# Patient Record
Sex: FEMALE | Race: WHITE
[De-identification: names, ages, dates, MRNs, and addresses within clinical notes are randomized per-mention and may not be internally consistent; named-entity substitution may affect disease eponyms.]

---

## 2020-12-30 ENCOUNTER — HOSPITAL ENCOUNTER (OUTPATIENT)
Dept: HOSPITAL 33 - SDC | Age: 60
Discharge: HOME | End: 2020-12-30
Attending: FAMILY MEDICINE
Payer: COMMERCIAL

## 2020-12-30 VITALS — HEART RATE: 85 BPM | SYSTOLIC BLOOD PRESSURE: 148 MMHG | DIASTOLIC BLOOD PRESSURE: 96 MMHG

## 2020-12-30 VITALS — OXYGEN SATURATION: 96 %

## 2020-12-30 DIAGNOSIS — D12.8: ICD-10-CM

## 2020-12-30 DIAGNOSIS — D12.3: ICD-10-CM

## 2020-12-30 DIAGNOSIS — K57.30: ICD-10-CM

## 2020-12-30 DIAGNOSIS — Z12.11: Primary | ICD-10-CM

## 2020-12-30 NOTE — OP
SURGERY DATE/TIME:   12/30/2020  0800    



PREOPERATIVE DIAGNOSES:      

1)  History of diverticulitis. 

2)  Need for screening colonoscopy.



POSTOPERATIVE DIAGNOSES:    

1)  Colon polyps x2. 

2)  Sigmoid diverticulosis.



PROCEDURE:    Colonoscopy. 



SURGEON: Rayshawn Mckeon M.D.



ANESTHESIA:  MAC by Chente Jacobs CRNA. 



ESTIMATED BLOOD LOSS:  Minimal. 



SPECIMENS:  Two hot forceps polypectomies one from the transverse colon and one from the 
rectum.



DESCRIPTION OF PROCEDURE:  After informed written consent was obtained, the patient was 
taken to the endoscopy suite. She was placed in left lateral decubitus position and 
anesthesia was titrated to desired level of consciousness. A digital rectal exam showed 
normal sphincter tone and no internal lesions. The scope was inserted into the rectum and 
sequentially the entire colonic mucosa was traversed. The level of cecum was reached and 
verified with direct visualization of ileocecal valve. Upon withdrawal careful mucosal 
inspection revealed a small sessile polyp in the proximal transverse colon which was 
grasped with forceps and cautery was used to burn the base. It was removed in its entirety 
with good hemostasis. Upon further withdrawal there were scattered diverticula mostly in 
the sigmoid colon with no obvious bleeding or other abnormalities encountered. In the 
proximal rectum there was another small sessile polyp grasped with forceps, cauterized and 
removed in its entirety with good hemostasis. Retroflexion showed no internal lesions. The 
scope was removed and the patient was transferred to the recovery room in good condition.  
I have advised she hold her aspirin for the next week and follow up for pathology results 
in one week.

## 2023-05-03 ENCOUNTER — HOSPITAL ENCOUNTER (OUTPATIENT)
Dept: HOSPITAL 33 - SDC | Age: 63
Discharge: HOME | End: 2023-05-03
Attending: FAMILY MEDICINE
Payer: OTHER GOVERNMENT

## 2023-05-03 VITALS — SYSTOLIC BLOOD PRESSURE: 123 MMHG | DIASTOLIC BLOOD PRESSURE: 73 MMHG

## 2023-05-03 VITALS — OXYGEN SATURATION: 96 % | HEART RATE: 80 BPM

## 2023-05-03 DIAGNOSIS — E11.9: ICD-10-CM

## 2023-05-03 DIAGNOSIS — Z86.010: ICD-10-CM

## 2023-05-03 DIAGNOSIS — D12.3: ICD-10-CM

## 2023-05-03 DIAGNOSIS — Z09: Primary | ICD-10-CM

## 2023-05-03 DIAGNOSIS — Z80.0: ICD-10-CM

## 2023-05-03 PROCEDURE — 82947 ASSAY GLUCOSE BLOOD QUANT: CPT

## 2023-05-03 NOTE — OP
SURGERY DATE/TIME:  05/03/2023  0701    



PREOPERATIVE DIAGNOSES:      

1) Personal history of colon polyps. 

2) Family history of colon cancer. 



POSTOPERATIVE DIAGNOSIS:    Transverse colon polyp.



PROCEDURE:    Colonoscopy. 



SURGEON:  Rayshawn Mckeon M.D.



ANESTHESIA:  MAC by Chente Jacobs CRNA. 



ESTIMATED BLOOD LOSS:  Minimal. 



SPECIMENS:  Hot forceps polypectomy transverse colon polyp. 



DESCRIPTION OF PROCEDURE:  After informed written consent was obtained, the patient was 
taken to the endoscopy suite.  She was placed in left lateral decubitus position and 
anesthesia was titrated to desired level of consciousness. Digital rectal exam showed 
normal sphincter tone and no internal lesions. The scope was inserted into the rectum and 
sequentially the entire colonic mucosa was traversed. The level of cecum was reached and 
verified with direct visualization of the ileocecal valve. Upon withdrawal careful mucosal 
inspection revealed subtle broad based polypoid lesion in the transverse colon along a 
fold. It was grasped with forceps, cauterized and sample was taken in three separate 
segments to cover the entire length of the lesion along the fold. Samples were collected 
and sent for pathology. The entire lesion appeared to be adequately cauterized and 
destroyed following this procedure. There was no bleeding upon inspection. Upon withdrawal 
the remainder of the exam was unremarkable. Prior to withdrawal retroflexion was performed 
and showed no internal lesions. The scope was removed. The patient was transferred to the 
recovery room in good condition. She was instructed to follow up in a week for pathology 
results and follow up instructions.